# Patient Record
(demographics unavailable — no encounter records)

---

## 2024-12-17 NOTE — HISTORY OF PRESENT ILLNESS
[Disease: _____________________] : Disease: [unfilled] [T: ___] : T[unfilled] [N: ___] : N[unfilled] [M: ___] : M[unfilled] [AJCC Stage: ____] : AJCC Stage: [unfilled] [de-identified] : Ms. Oconnor first came to medical attention after complaining of fecal urgency. FIT testing with her doctor positive. There was no associated weight loss. She was therefore referred for colonoscopy.  She was found to have a 4cm mass in the proximal to mid rectum. Final pathology revealed moderately differentiated adenocarcinoma. CT of the chest, abdomen and pelvis revealed no evidence of metastatic disease. MRI of the pelvis from [facility] revealed a 1.4 x 3.6 x 3.3cm mass in the upper rectum, with no gross evidence of tumor extension beyond the muscularis. The tumor was 1.3cm from the circumferential resection margin. No suspicious lymphadenopathy was identified. Final clinical staging is T3N0M0.   She is a recent smoker, having quit 3 weeks prior to our first consultation. Planning on repeat CT for subcentimeter groundglass nodules in the right lung in 6-12 months.  Ms. Oconnor underwent hemicolectomy on August 3, 2021. She was found to have a 4cm mass, well to moderately differentiated, involving the muscularis propria. T2N0M0, stage I colorectal cancer. 0/13 nodes. No LVI, no PNI. Neg margins.  April 5, 2022 CT chest abdomen and pelvis revealed no evidence of metastatic disease status post rectal mucosal.  Interval resolution of groundglass lung nodules.  October '22 CT KELLY. [de-identified] : She returns today for a follow up. Doing well overall. States she completed colonoscopy w/ Dr. Zaldivar which showed KELLY, next due in 3-5 years. Endorses normal BMs and urination, well-rounded diet.  3/29/24 CT C/A/P:  - Unchanged right upper lobe subpieural 3 mm nodule. Right lower lobe linear atelectasis/scaring. - Stable abdominal pelvic CT without evidence of recurrence.  12/17/24 CBC: WBC 7.55 K/uL, HGB 15.2 g/dL, HCT 45.1%,  K/uL

## 2024-12-17 NOTE — ADDENDUM
[FreeTextEntry1] : All medical record entries made by the Scribe were at my, Dr. Miguel Cain, direction and personally dictated by me on 12/17/2024. I have reviewed the chart and agree that the record accurately reflects my personal performance of the history, physical exam, assessment and plan. I have also personally directed, reviewed, and agreed with the chart.   I, David Jackson, documented this note as a scribe on behalf of Dr. Miguel Cain on 12/17/2024.

## 2024-12-17 NOTE — PHYSICAL EXAM
[Fully active, able to carry on all pre-disease performance without restriction] : Status 0 - Fully active, able to carry on all pre-disease performance without restriction [Thin] : thin [Normal] : affect appropriate [de-identified] : vocal hoarseness [de-identified] : anicteric [de-identified] : mild expiratory wheeze diffusely

## 2024-12-17 NOTE — REVIEW OF SYSTEMS
[Diarrhea: Grade 0] : Diarrhea: Grade 0 [Fatigue] : no fatigue [Recent Change In Weight] : ~T no recent weight change [Dysphagia] : no dysphagia [Odynophagia] : no odynophagia [Chest Pain] : no chest pain [Shortness Of Breath] : no shortness of breath [Abdominal Pain] : no abdominal pain [Constipation] : no constipation [Joint Pain] : no joint pain [Joint Stiffness] : no joint stiffness [Anxiety] : no anxiety [Depression] : no depression [Easy Bleeding] : no tendency for easy bleeding [Easy Bruising] : no tendency for easy bruising [Swollen Glands] : no swollen glands

## 2024-12-17 NOTE — RESULTS/DATA
[FreeTextEntry1] : Ms. Oconnor is a pleasant 63 year-old woman with [id number] (stage I) colorectal cancer s/p resection in August, 2021.

## 2025-06-18 NOTE — ADDENDUM
[FreeTextEntry1] : All medical record entries made by the Scribe were at my, Dr. Miguel Cain, direction and personally dictated by me on 06/18/2025. I have reviewed the chart and agree that the record accurately reflects my personal performance of the history, physical exam, assessment and plan. I have also personally directed, reviewed, and agreed with the chart.   I, David Jackson, documented this note as a scribe on behalf of Dr. Miguel Cain on 06/18/2025.

## 2025-06-18 NOTE — HISTORY OF PRESENT ILLNESS
[Disease: _____________________] : Disease: [unfilled] [T: ___] : T[unfilled] [N: ___] : N[unfilled] [M: ___] : M[unfilled] [AJCC Stage: ____] : AJCC Stage: [unfilled] [Date: ____________] : Patient's last distress assessment performed on [unfilled]. [2 - Distress Level] : Distress Level: 2 [de-identified] : Ms. Oconnor first came to medical attention after complaining of fecal urgency. FIT testing with her doctor positive. There was no associated weight loss. She was therefore referred for colonoscopy.  She was found to have a 4cm mass in the proximal to mid rectum. Final pathology revealed moderately differentiated adenocarcinoma. CT of the chest, abdomen and pelvis revealed no evidence of metastatic disease. MRI of the pelvis from [facility] revealed a 1.4 x 3.6 x 3.3cm mass in the upper rectum, with no gross evidence of tumor extension beyond the muscularis. The tumor was 1.3cm from the circumferential resection margin. No suspicious lymphadenopathy was identified. Final clinical staging is T3N0M0.   She is a recent smoker, having quit 3 weeks prior to our first consultation. Planning on repeat CT for subcentimeter groundglass nodules in the right lung in 6-12 months.  Ms. Oconnor underwent hemicolectomy on August 3, 2021. She was found to have a 4cm mass, well to moderately differentiated, involving the muscularis propria. T2N0M0, stage I colorectal cancer. 0/13 nodes. No LVI, no PNI. Neg margins.  April 5, 2022 CT chest abdomen and pelvis revealed no evidence of metastatic disease status post rectal mucosal.  Interval resolution of groundglass lung nodules.  October '22 CT KELLY. [de-identified] : She returns today for a follow up.  States that she feels okay. Completed Mohs surgery on nose 2 weeks ago, pathology not available. Endorses normal BMs and urination, with no abdominal pain. Has not recently followed w/ GI or colorectal surgery. Continues smoking, 5-6 cigarettes and 2 alcoholic drinks per day.  5/22/25 CT C/A/P: No evidence of metastatic disease or suspicious adenopathy in the chest, abdomen, or pelvis.

## 2025-06-18 NOTE — HISTORY OF PRESENT ILLNESS
[Disease: _____________________] : Disease: [unfilled] [T: ___] : T[unfilled] [N: ___] : N[unfilled] [M: ___] : M[unfilled] [AJCC Stage: ____] : AJCC Stage: [unfilled] [Date: ____________] : Patient's last distress assessment performed on [unfilled]. [2 - Distress Level] : Distress Level: 2 [de-identified] : Ms. Oconnor first came to medical attention after complaining of fecal urgency. FIT testing with her doctor positive. There was no associated weight loss. She was therefore referred for colonoscopy.  She was found to have a 4cm mass in the proximal to mid rectum. Final pathology revealed moderately differentiated adenocarcinoma. CT of the chest, abdomen and pelvis revealed no evidence of metastatic disease. MRI of the pelvis from [facility] revealed a 1.4 x 3.6 x 3.3cm mass in the upper rectum, with no gross evidence of tumor extension beyond the muscularis. The tumor was 1.3cm from the circumferential resection margin. No suspicious lymphadenopathy was identified. Final clinical staging is T3N0M0.   She is a recent smoker, having quit 3 weeks prior to our first consultation. Planning on repeat CT for subcentimeter groundglass nodules in the right lung in 6-12 months.  Ms. Oconnor underwent hemicolectomy on August 3, 2021. She was found to have a 4cm mass, well to moderately differentiated, involving the muscularis propria. T2N0M0, stage I colorectal cancer. 0/13 nodes. No LVI, no PNI. Neg margins.  April 5, 2022 CT chest abdomen and pelvis revealed no evidence of metastatic disease status post rectal mucosal.  Interval resolution of groundglass lung nodules.  October '22 CT KELLY. [de-identified] : She returns today for a follow up.  States that she feels okay. Completed Mohs surgery on nose 2 weeks ago, pathology not available. Endorses normal BMs and urination, with no abdominal pain. Has not recently followed w/ GI or colorectal surgery. Continues smoking, 5-6 cigarettes and 2 alcoholic drinks per day.  5/22/25 CT C/A/P: No evidence of metastatic disease or suspicious adenopathy in the chest, abdomen, or pelvis.

## 2025-06-18 NOTE — RESULTS/DATA
[FreeTextEntry1] : Ms. Oconnor is a pleasant 63 year-old woman with [id number] (stage I) colorectal cancer s/p resection in August, 2021.

## 2025-06-18 NOTE — PHYSICAL EXAM
[Fully active, able to carry on all pre-disease performance without restriction] : Status 0 - Fully active, able to carry on all pre-disease performance without restriction [Thin] : thin [Normal] : affect appropriate [de-identified] : vocal hoarseness [de-identified] : anicteric [de-identified] : mild expiratory wheeze diffusely

## 2025-06-18 NOTE — REVIEW OF SYSTEMS
[Diarrhea: Grade 0] : Diarrhea: Grade 0 [Cough] : cough [Fatigue] : no fatigue [Recent Change In Weight] : ~T no recent weight change [Dysphagia] : no dysphagia [Odynophagia] : no odynophagia [Chest Pain] : no chest pain [Shortness Of Breath] : no shortness of breath [Abdominal Pain] : no abdominal pain [Constipation] : no constipation [Joint Pain] : no joint pain [Joint Stiffness] : no joint stiffness [Anxiety] : no anxiety [Depression] : no depression [Easy Bleeding] : no tendency for easy bleeding [Easy Bruising] : no tendency for easy bruising [Swollen Glands] : no swollen glands

## 2025-06-18 NOTE — PHYSICAL EXAM
[Fully active, able to carry on all pre-disease performance without restriction] : Status 0 - Fully active, able to carry on all pre-disease performance without restriction [Thin] : thin [Normal] : affect appropriate [de-identified] : vocal hoarseness [de-identified] : anicteric [de-identified] : mild expiratory wheeze diffusely

## 2025-06-18 NOTE — RESULTS/DATA
[FreeTextEntry1] : Ms. Oconnor is a pleasant 63 year-old woman with [id number] (stage I) colorectal cancer s/p resection in August, 2021.